# Patient Record
Sex: MALE | Race: WHITE | Employment: FULL TIME | URBAN - METROPOLITAN AREA
[De-identification: names, ages, dates, MRNs, and addresses within clinical notes are randomized per-mention and may not be internally consistent; named-entity substitution may affect disease eponyms.]

---

## 2018-10-01 ENCOUNTER — OFFICE VISIT (OUTPATIENT)
Dept: FAMILY MEDICINE CLINIC | Age: 33
End: 2018-10-01
Payer: COMMERCIAL

## 2018-10-01 VITALS
BODY MASS INDEX: 26.36 KG/M2 | WEIGHT: 194.6 LBS | TEMPERATURE: 97.5 F | OXYGEN SATURATION: 98 % | HEIGHT: 72 IN | DIASTOLIC BLOOD PRESSURE: 60 MMHG | SYSTOLIC BLOOD PRESSURE: 122 MMHG | HEART RATE: 72 BPM

## 2018-10-01 DIAGNOSIS — Z71.6 ENCOUNTER FOR SMOKING CESSATION COUNSELING: ICD-10-CM

## 2018-10-01 DIAGNOSIS — L02.212 CUTANEOUS ABSCESS OF BACK EXCLUDING BUTTOCKS: Primary | ICD-10-CM

## 2018-10-01 PROCEDURE — 99214 OFFICE O/P EST MOD 30 MIN: CPT | Performed by: NURSE PRACTITIONER

## 2018-10-01 PROCEDURE — G8484 FLU IMMUNIZE NO ADMIN: HCPCS | Performed by: NURSE PRACTITIONER

## 2018-10-01 PROCEDURE — G8419 CALC BMI OUT NRM PARAM NOF/U: HCPCS | Performed by: NURSE PRACTITIONER

## 2018-10-01 PROCEDURE — G8427 DOCREV CUR MEDS BY ELIG CLIN: HCPCS | Performed by: NURSE PRACTITIONER

## 2018-10-01 PROCEDURE — 4004F PT TOBACCO SCREEN RCVD TLK: CPT | Performed by: NURSE PRACTITIONER

## 2018-10-01 RX ORDER — VARENICLINE TARTRATE 1 MG/1
1 TABLET, FILM COATED ORAL 2 TIMES DAILY
Qty: 60 TABLET | Refills: 2 | Status: SHIPPED | OUTPATIENT
Start: 2018-10-01 | End: 2019-04-15 | Stop reason: SDUPTHER

## 2018-10-01 RX ORDER — VARENICLINE TARTRATE 25 MG
KIT ORAL
Qty: 1 EACH | Refills: 0 | Status: SHIPPED | OUTPATIENT
Start: 2018-10-01 | End: 2019-04-15

## 2018-10-01 RX ORDER — CEPHALEXIN 500 MG/1
500 CAPSULE ORAL 3 TIMES DAILY
Qty: 30 CAPSULE | Refills: 0 | Status: SHIPPED | OUTPATIENT
Start: 2018-10-01 | End: 2018-10-16 | Stop reason: SDUPTHER

## 2018-10-01 ASSESSMENT — PATIENT HEALTH QUESTIONNAIRE - PHQ9
1. LITTLE INTEREST OR PLEASURE IN DOING THINGS: 0
SUM OF ALL RESPONSES TO PHQ QUESTIONS 1-9: 0
2. FEELING DOWN, DEPRESSED OR HOPELESS: 0
SUM OF ALL RESPONSES TO PHQ QUESTIONS 1-9: 0
SUM OF ALL RESPONSES TO PHQ9 QUESTIONS 1 & 2: 0

## 2018-10-01 ASSESSMENT — ENCOUNTER SYMPTOMS
SHORTNESS OF BREATH: 0
COUGH: 0

## 2018-10-16 DIAGNOSIS — L02.212 CUTANEOUS ABSCESS OF BACK EXCLUDING BUTTOCKS: ICD-10-CM

## 2018-10-19 RX ORDER — CEPHALEXIN 500 MG/1
500 CAPSULE ORAL 3 TIMES DAILY
Qty: 30 CAPSULE | Refills: 0 | Status: SHIPPED | OUTPATIENT
Start: 2018-10-19

## 2019-04-12 DIAGNOSIS — Z71.6 ENCOUNTER FOR SMOKING CESSATION COUNSELING: ICD-10-CM

## 2019-04-12 NOTE — TELEPHONE ENCOUNTER
Last visit  10/1/18  Next Visit Date:  No future appointments.     Health Maintenance   Topic Date Due    Pneumococcal 0-64 years Vaccine (1 of 1 - PPSV23) 10/10/1992    Varicella Vaccine (1 of 2 - 13+ 2-dose series) 10/10/1999    HIV screen  10/10/2001    DTaP/Tdap/Td vaccine (1 - Tdap) 10/10/2005    Flu vaccine (Season Ended) 09/01/2019       No results found for: LABA1C          ( goal A1C is < 7)   No results found for: LABMICR  No results found for: LDLCHOLESTEROL, LDLCALC    (goal LDL is <100)   AST (U/L)   Date Value   03/16/2015 29     ALT (U/L)   Date Value   03/16/2015 22     BUN (mg/dL)   Date Value   03/16/2015 18     BP Readings from Last 3 Encounters:   10/01/18 122/60   03/16/15 116/66   03/04/15 113/69          (goal 120/80)    All Future Testing planned in [facility]              Patient Active Problem List:     Chronic back pain     Nevus     Nicotine dependence

## 2019-04-15 RX ORDER — VARENICLINE TARTRATE 25 MG
KIT ORAL
Qty: 1 EACH | Refills: 0 | Status: SHIPPED | OUTPATIENT
Start: 2019-04-15

## 2019-04-15 RX ORDER — VARENICLINE TARTRATE 1 MG/1
1 TABLET, FILM COATED ORAL 2 TIMES DAILY
Qty: 60 TABLET | Refills: 4 | Status: SHIPPED | OUTPATIENT
Start: 2019-04-15